# Patient Record
Sex: FEMALE | Race: WHITE
[De-identification: names, ages, dates, MRNs, and addresses within clinical notes are randomized per-mention and may not be internally consistent; named-entity substitution may affect disease eponyms.]

---

## 2019-03-03 ENCOUNTER — HOSPITAL ENCOUNTER (EMERGENCY)
Dept: HOSPITAL 10 - FTE | Age: 29
Discharge: HOME | End: 2019-03-03
Payer: SELF-PAY

## 2019-03-03 ENCOUNTER — HOSPITAL ENCOUNTER (EMERGENCY)
Dept: HOSPITAL 91 - FTE | Age: 29
Discharge: HOME | End: 2019-03-03
Payer: SELF-PAY

## 2019-03-03 VITALS — RESPIRATION RATE: 19 BRPM | DIASTOLIC BLOOD PRESSURE: 65 MMHG | SYSTOLIC BLOOD PRESSURE: 148 MMHG | HEART RATE: 77 BPM

## 2019-03-03 VITALS
WEIGHT: 150.36 LBS | HEIGHT: 65 IN | HEIGHT: 65 IN | WEIGHT: 150.36 LBS | BODY MASS INDEX: 25.05 KG/M2 | BODY MASS INDEX: 25.05 KG/M2

## 2019-03-03 DIAGNOSIS — R10.12: Primary | ICD-10-CM

## 2019-03-03 LAB
ADD MAN DIFF?: NO
ADD UMIC: YES
ALANINE AMINOTRANSFERASE: 13 IU/L (ref 13–69)
ALBUMIN/GLOBULIN RATIO: 1.38
ALBUMIN: 5 G/DL (ref 3.3–4.9)
ALKALINE PHOSPHATASE: 93 IU/L (ref 42–121)
ANION GAP: 12 (ref 5–13)
ASPARTATE AMINO TRANSFERASE: 22 IU/L (ref 15–46)
BASOPHIL #: 0 10^3/UL (ref 0–0.1)
BASOPHILS %: 0.5 % (ref 0–2)
BILIRUBIN,DIRECT: 0 MG/DL (ref 0–0.2)
BILIRUBIN,TOTAL: 0.1 MG/DL (ref 0.2–1.3)
BLOOD UREA NITROGEN: 13 MG/DL (ref 7–20)
CALCIUM: 9.8 MG/DL (ref 8.4–10.2)
CARBON DIOXIDE: 25 MMOL/L (ref 21–31)
CHLORIDE: 106 MMOL/L (ref 97–110)
CREATININE: 0.7 MG/DL (ref 0.44–1)
EOSINOPHILS #: 0.1 10^3/UL (ref 0–0.5)
EOSINOPHILS %: 0.7 % (ref 0–7)
GLOBULIN: 3.6 G/DL (ref 1.3–3.2)
GLUCOSE: 95 MG/DL (ref 70–220)
HEMATOCRIT: 40.5 % (ref 37–47)
HEMOGLOBIN: 13 G/DL (ref 12–16)
IMMATURE GRANS #M: 0.02 10^3/UL (ref 0–0.03)
IMMATURE GRANS % (M): 0.2 % (ref 0–0.43)
LIPASE: 74 U/L (ref 23–300)
LYMPHOCYTES #: 2.5 10^3/UL (ref 0.8–2.9)
LYMPHOCYTES %: 28.4 % (ref 15–51)
MEAN CORPUSCULAR HEMOGLOBIN: 26.9 PG (ref 29–33)
MEAN CORPUSCULAR HGB CONC: 32.1 G/DL (ref 32–37)
MEAN CORPUSCULAR VOLUME: 83.7 FL (ref 82–101)
MEAN PLATELET VOLUME: 9.7 FL (ref 7.4–10.4)
MONOCYTE #: 0.6 10^3/UL (ref 0.3–0.9)
MONOCYTES %: 6.9 % (ref 0–11)
NEUTROPHIL #: 5.5 10^3/UL (ref 1.6–7.5)
NEUTROPHILS %: 63.3 % (ref 39–77)
NUCLEATED RED BLOOD CELLS #: 0 10^3/UL (ref 0–0)
NUCLEATED RED BLOOD CELLS%: 0 /100WBC (ref 0–0)
PLATELET COUNT: 283 10^3/UL (ref 140–415)
POTASSIUM: 3.7 MMOL/L (ref 3.5–5.1)
RED BLOOD COUNT: 4.84 10^6/UL (ref 4.2–5.4)
RED CELL DISTRIBUTION WIDTH: 13.8 % (ref 11.5–14.5)
SODIUM: 143 MMOL/L (ref 135–144)
TOTAL PROTEIN: 8.6 G/DL (ref 6.1–8.1)
UR ASCORBIC ACID: 40 MG/DL
UR BACTERIA: (no result) /HPF
UR BILIRUBIN (DIP): NEGATIVE MG/DL
UR BLOOD (DIP): (no result) MG/DL
UR CLARITY: (no result)
UR COLOR: YELLOW
UR GLUCOSE (DIP): NEGATIVE MG/DL
UR KETONES (DIP): NEGATIVE MG/DL
UR LEUKOCYTE ESTERASE (DIP): NEGATIVE LEU/UL
UR MUCUS: (no result) /HPF
UR NITRITE (DIP): NEGATIVE MG/DL
UR PH (DIP): 5 (ref 5–9)
UR RBC: 1 /HPF (ref 0–5)
UR SPECIFIC GRAVITY (DIP): 1.03 (ref 1–1.03)
UR SQUAMOUS EPITHELIAL CELL: (no result) /HPF
UR TOTAL PROTEIN (DIP): NEGATIVE MG/DL
UR UROBILINOGEN (DIP): NEGATIVE MG/DL
UR WBC: 2 /HPF (ref 0–5)
WHITE BLOOD COUNT: 8.7 10^3/UL (ref 4.8–10.8)

## 2019-03-03 PROCEDURE — 85025 COMPLETE CBC W/AUTO DIFF WBC: CPT

## 2019-03-03 PROCEDURE — 36415 COLL VENOUS BLD VENIPUNCTURE: CPT

## 2019-03-03 PROCEDURE — 81001 URINALYSIS AUTO W/SCOPE: CPT

## 2019-03-03 PROCEDURE — 81025 URINE PREGNANCY TEST: CPT

## 2019-03-03 PROCEDURE — 80053 COMPREHEN METABOLIC PANEL: CPT

## 2019-03-03 PROCEDURE — 83690 ASSAY OF LIPASE: CPT

## 2019-03-03 PROCEDURE — 99283 EMERGENCY DEPT VISIT LOW MDM: CPT

## 2019-03-03 RX ADMIN — RANITIDINE 1 MG: 150 TABLET ORAL at 15:59

## 2019-03-03 NOTE — ERD
ER Documentation


Chief Complaint


Chief Complaint





increasing swelling/pain below L breast X 3 months, radiating AP





HPI


29-year-old female patient with no significant past medical history presents to 


ED complaining of left upper quadrant abdominal pain that started intermittently


for the last 3 months.  States that she feels a bump in that area.  Reports it 


radiates to her abdomen.  Denies any nausea, vomiting, diarrhea, neck stiffness.


 Denies any chest pain, shortness of breath, cough, rhinorrhea.





ROS


All systems reviewed and are negative except as per history of present illness.





Medications


Home Meds


Active Scripts


Ranitidine Hcl* (Zantac*) 150 Mg Tablet, 150 MG PO BID PRN for EPIGASTRIC PAIN, 


#30 TAB


   Prov:DIANNA CHIU PA-C         3/3/19





Allergies


Allergies:  


Coded Allergies:  


     No Known Allergy (Unverified , 3/3/19)





PMhx/Soc


History of Surgery:  Yes (dental surgery)


Hx Alcohol Use:  No


Hx Substance Use:  No


Hx Tobacco Use:  No


Smoking Status:  Never smoker





FmHx


Family History:  No diabetes, No coronary disease





Physical Exam


Vitals


Vital Signs


  Date      Temp  Pulse  Resp  B/P (MAP)   Pulse Ox  O2          O2 Flow    FiO2


Time                                                 Delivery    Rate


    3/3/19  99.2     92    18      150/69       100


     14:39                           (96)





Physical Exam


Const: Non-ill-appearing, well-nourished. In no acute distress.


Head: Atraumatic, normocephalic 


Eyes: Normal Conjunctiva without injection. No purulent discharge. 


ENT: Normal external ear, nose. Moist oropharynx without tonsillar exudates. 


Non-erythematous pharynx. Uvula midline. No drooling.  No trismus. 


Neck: No cervical midline tenderness.  Full range of motion. No meningismus. No 


cervical lymphadenopathy. No JVD.


Resp: Clear to auscultation bilaterally. No wheezing, rhonchi, rales, or 


crackles. No accessory muscle use. No retractions.


Cardio: Regular rate and rhythm.  No murmurs, rubs or gallops.


Abd: Soft, left upper quadrant abdominal pain, non distended. Normal bowel 


sounds.  No palpable masses.  No rebound tenderness.  No guarding.  Negative 


McBurney's point. Negative psoas sign. Negative obturator sign.


Skin: No petechiae or rashes


Back: No midline tenderness. No CVA tenderness.


Ext: No cyanosis, or edema.


Neur: Awake and alert. Normal gait. Normal coordination. 


Psych: Normal Mood and Affect


Results 24 hrs





Laboratory Tests


Test
                                3/3/19
16:10     3/3/19
16:19  3/3/19
16:24


White Blood Count                    8.7 10^3/ul


Red Blood Count                     4.84 10^6/ul


Hemoglobin                             13.0 g/dl


Hematocrit                                40.5 %


Mean Corpuscular Volume                  83.7 fl


Mean Corpuscular Hemoglobin              26.9 pg


Mean Corpuscular                      32.1 g/dl 
  
                



Hemoglobin
Concent


Red Cell Distribution Width               13.8 %


Platelet Count                       283 10^3/UL


Mean Platelet Volume                      9.7 fl


Immature Granulocytes %                  0.200 %


Neutrophils %                             63.3 %


Lymphocytes %                             28.4 %


Monocytes %                                6.9 %


Eosinophils %                              0.7 %


Basophils %                                0.5 %


Nucleated Red Blood Cells %          0.0 /100WBC


Immature Granulocytes #            0.020 10^3/ul


Neutrophils #                        5.5 10^3/ul


Lymphocytes #                        2.5 10^3/ul


Monocytes #                          0.6 10^3/ul


Eosinophils #                        0.1 10^3/ul


Basophils #                          0.0 10^3/ul


Nucleated Red Blood Cells #          0.0 10^3/ul


Sodium Level                          143 mmol/L


Potassium Level                       3.7 mmol/L


Chloride Level                        106 mmol/L


Carbon Dioxide Level                   25 mmol/L


Anion Gap                                     12


Blood Urea Nitrogen                     13 mg/dl


Creatinine                            0.70 mg/dl


Est Glomerular Filtrat             > 60 mL/min 
   
                



Rate
mL/min


Glucose Level                           95 mg/dl


Calcium Level                          9.8 mg/dl


Total Bilirubin                        0.1 mg/dl


Direct Bilirubin                      0.00 mg/dl


Indirect Bilirubin                     0.1 mg/dl


Aspartate Amino Transf
(AST/SGOT)       22 IU/L 
  
                



Alanine                                 13 IU/L 
  
                



Aminotransferase
(ALT/SGPT)


Alkaline Phosphatase                     93 IU/L


Total Protein                           8.6 g/dl


Albumin                                 5.0 g/dl


Globulin                               3.60 g/dl


Albumin/Globulin Ratio                      1.38


Lipase                                    74 U/L


Urine Color                                        YELLOW


Urine Clarity
                     
               SLIGHTLY
CLOUDY  



Urine pH                                                      5.0


Urine Specific Gravity                                      1.027


Urine Ketones                                      NEGATIVE mg/dL


Urine Nitrite                                      NEGATIVE mg/dL


Urine Bilirubin                                    NEGATIVE mg/dL


Urine Urobilinogen                                 NEGATIVE mg/dL


Urine Leukocyte Esterase
          
               NEGATIVE
Nory/ul  



Urine Microscopic RBC                                      1 /HPF


Urine Microscopic WBC                                      2 /HPF


Urine Squamous Epithelial
Cells    
               FEW /HPF 
       



Urine Bacteria                                     FEW /HPF


Urine Mucus                                        FEW /HPF


Urine Hemoglobin                                         1+ mg/dL


Urine Glucose                                      NEGATIVE mg/dL


Urine Total Protein                                NEGATIVE mg/dl


POC Beta HCG, Qualitative                                           NEGATIVE





Current Medications


 Medications
   Dose
          Sig/Hemalatha
       Start Time
   Status  Last


 (Trade)       Ordered        Route
 PRN     Stop Time              Admin
Dose


                              Reason                                Admin


 Ranitidine     150 mg         ONCE  ONCE
    3/3/19        DC            3/3/19


HCl
                          PO
            16:00
 3/3/19                15:59



(Zantac)                                     16:01








Procedures/MDM


29-year-old female patient with no significant past medical history presents to 


ED complaining of left lower quadrant abdominal pain.  Patient is afebrile and 


nontoxic-appearing.  Patient was further worked up with CBC, CMP, lipase, UA, 


urine pregnancy. Patient's pain and symptoms have improved after treatment with 


ranitidine helps with the burning sensation of her left upper quadrant. 





CBC:  No leukocytosis. No e/o of systemic infection. No e/o anemia.


CMP: No e/o severe acidosis, alkalosis, renal failure, diabetic ketoacidosis, 


liver disease


Lipase within normal limits.


Urine: No leukocyte esterase, no nitrites, 1+ hematuria. 


Urine pregnancy: Negative 





Differentials include gastritis versus GERD.  Low suspicion for ectopic 


pregnancy, ovarian torsion,  cholecystitis, choledocholithiasis, cholangitis, 


pancreatitis, appendicitis, bowel obstruction, ileus, volvulus, nephrolithiasis,


pyelonephritis, hepatitis, perforated viscus, diverticulitis, 


strangulated/incarcerated hernia, DKA, acute abdomen, mesenteric ischemia or oth


er emergent conditions.





There is no bump that patient is describing at this time.  However I instructed 


patient that if an abscess or infected sebaceous cyst forms, she can return to 


the ED for any worsening symptoms, evaluation or incision and drainage.





Discharge medications: Zantac 


Follow up with primary care physician in 1-2 days for referral to 


gastroenterologist. Instructed patient to return to the ED sooner for any 


worsening symptoms. Patient's questions were answered. Patient understood and 


agreed with discharge plan. Patient discharged stable.





Departure


Diagnosis:  


   Primary Impression:  


   Intermittent left upper quadrant abdominal pain


Condition:  Stable


Patient Instructions:  Gerd (Adult), Gastritis Vs. Ulcer, Epigastric Pain 


(Uncertain Cause)


Referrals:  


[facility]


YOU HAVE RECEIVED A MEDICAL SCREENING EXAM AND THE RESULTS INDICATE THAT YOU DO 


NOT HAVE A CONDITION THAT REQUIRES URGENT TREATMENT IN THE EMERGENCY DEPARTMENT.





FURTHER EVALUATION AND TREATMENT OF YOUR CONDITION CAN WAIT UNTIL YOU ARE SEEN 


IN YOUR DOCTORS OFFICE WITHIN THE NEXT 1-2 DAYS. IT IS YOUR RESPONSIBILITY TO 


MAKE AN APPOINTMENT FOR FOLOW-UP CARE.





IF YOU HAVE A PRIMARY DOCTOR


--you should call your primary doctor and schedule an appointment





IF YOU DO NOT HAVE A PRIMARY DOCTOR YOU CAN CALL OUR PHYSICIAN REFERRAL HOTLINE 


AT


 [phone] 





IF YOU CAN NOT AFFORD TO SEE A PHYSICIAN YOU CAN CHOSE FROM THE FOLLOWING 


[facility] [phone] [address] [facility] [phone] [address] [facility]. [facility] [phone] [address] [facility] [phone] [address] [facility] [phone] [address] [facility]. [facility] [phone]


[address] [facility]. [facility]


YOU HAVE RECEIVED A MEDICAL SCREENING EXAM AND THE RESULTS INDICATE THAT YOU DO 


NOT HAVE A CONDITION THAT REQUIRES URGENT TREATMENT IN THE EMERGENCY DEPARTMENT.





FURTHER EVALUATION AND TREATMENT OF YOUR CONDITION CAN WAIT UNTIL YOU ARE SEEN 


IN YOUR DOCTORS OFFICE WITHIN THE NEXT 1-2 DAYS. IT IS YOUR RESPONSIBILITY TO 


MAKE AN APPOINTMENT FOR FOLOW-UP CARE.





IF YOU HAVE A PRIMARY DOCTOR


--you should call your primary doctor and schedule and appointment





IF YOU DO NOT HAVE A PRIMARY DOCTOR YOU CAN CALL OUR PHYSICIAN REFERRAL HOTLINE 


AT [phone].





IF YOU CAN NOT AFFORD TO SEE A PHYSICIAN YOU CAN CHOSE FROM THE FOLLOWING [facility]:





[facility]


[address] [place] [address]





[facility]


[address] [place] [address]





[facility]


[address] [place] [address]





[facility] URGENT CARE/SPECIALTIES





Additional Instructions:  


Call your primary care doctor TOMORROW for an appointment during the next 2-3 


days.See the doctor sooner or return here if your condition worsens before your 


appointment time.











DIANNA CHIU PA-C               Mar 3, 2019 18:05